# Patient Record
Sex: MALE | Race: WHITE | Employment: STUDENT | URBAN - METROPOLITAN AREA
[De-identification: names, ages, dates, MRNs, and addresses within clinical notes are randomized per-mention and may not be internally consistent; named-entity substitution may affect disease eponyms.]

---
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
## 2021-04-22 ENCOUNTER — IMMUNIZATION (OUTPATIENT)
Dept: NURSING | Facility: CLINIC | Age: 21
End: 2021-04-22
Payer: COMMERCIAL

## 2021-04-22 PROCEDURE — 91300 PR COVID VAC PFIZER DIL RECON 30 MCG/0.3 ML IM: CPT

## 2021-04-22 PROCEDURE — 0001A PR COVID VAC PFIZER DIL RECON 30 MCG/0.3 ML IM: CPT

## 2021-05-13 ENCOUNTER — IMMUNIZATION (OUTPATIENT)
Dept: NURSING | Facility: CLINIC | Age: 21
End: 2021-05-13
Attending: INTERNAL MEDICINE
Payer: COMMERCIAL

## 2021-05-13 PROCEDURE — 91300 PR COVID VAC PFIZER DIL RECON 30 MCG/0.3 ML IM: CPT

## 2021-05-13 PROCEDURE — 0002A PR COVID VAC PFIZER DIL RECON 30 MCG/0.3 ML IM: CPT

## 2021-06-05 ENCOUNTER — HEALTH MAINTENANCE LETTER (OUTPATIENT)
Age: 21
End: 2021-06-05

## 2021-09-25 ENCOUNTER — HEALTH MAINTENANCE LETTER (OUTPATIENT)
Age: 21
End: 2021-09-25

## 2021-11-18 ENCOUNTER — HOSPITAL ENCOUNTER (OUTPATIENT)
Facility: CLINIC | Age: 21
Setting detail: OBSERVATION
Discharge: HOME | End: 2021-11-19
Attending: EMERGENCY MEDICINE | Admitting: NURSE PRACTITIONER
Payer: COMMERCIAL

## 2021-11-18 ENCOUNTER — APPOINTMENT (OUTPATIENT)
Dept: GENERAL RADIOLOGY | Facility: CLINIC | Age: 21
End: 2021-11-18
Attending: EMERGENCY MEDICINE
Payer: COMMERCIAL

## 2021-11-18 DIAGNOSIS — R55 NEAR SYNCOPE: ICD-10-CM

## 2021-11-18 DIAGNOSIS — Z20.822 CONTACT WITH AND (SUSPECTED) EXPOSURE TO COVID-19: ICD-10-CM

## 2021-11-18 DIAGNOSIS — Z11.52 ENCOUNTER FOR SCREENING LABORATORY TESTING FOR SEVERE ACUTE RESPIRATORY SYNDROME CORONAVIRUS 2 (SARS-COV-2): ICD-10-CM

## 2021-11-18 DIAGNOSIS — R42 DIZZINESS: ICD-10-CM

## 2021-11-18 DIAGNOSIS — D72.829 LEUKOCYTOSIS, UNSPECIFIED TYPE: ICD-10-CM

## 2021-11-18 DIAGNOSIS — H53.8 BLURRED VISION: ICD-10-CM

## 2021-11-18 LAB
ALBUMIN SERPL-MCNC: 4.4 G/DL (ref 3.4–5)
ALP SERPL-CCNC: 72 U/L (ref 40–150)
ALT SERPL W P-5'-P-CCNC: 25 U/L (ref 0–70)
ANION GAP SERPL CALCULATED.3IONS-SCNC: 6 MMOL/L (ref 3–14)
AST SERPL W P-5'-P-CCNC: 16 U/L (ref 0–45)
BASOPHILS # BLD AUTO: 0.1 10E3/UL (ref 0–0.2)
BASOPHILS NFR BLD AUTO: 1 %
BILIRUB SERPL-MCNC: 0.5 MG/DL (ref 0.2–1.3)
BUN SERPL-MCNC: 15 MG/DL (ref 7–30)
CALCIUM SERPL-MCNC: 9.5 MG/DL (ref 8.5–10.1)
CHLORIDE BLD-SCNC: 105 MMOL/L (ref 94–109)
CO2 SERPL-SCNC: 27 MMOL/L (ref 20–32)
CREAT SERPL-MCNC: 1.03 MG/DL (ref 0.66–1.25)
EOSINOPHIL # BLD AUTO: 0.1 10E3/UL (ref 0–0.7)
EOSINOPHIL NFR BLD AUTO: 1 %
ERYTHROCYTE [DISTWIDTH] IN BLOOD BY AUTOMATED COUNT: 11.8 % (ref 10–15)
GFR SERPL CREATININE-BSD FRML MDRD: >90 ML/MIN/1.73M2
GLUCOSE BLD-MCNC: 92 MG/DL (ref 70–99)
GLUCOSE BLDC GLUCOMTR-MCNC: 108 MG/DL (ref 70–99)
HCT VFR BLD AUTO: 49.6 % (ref 40–53)
HGB BLD-MCNC: 17.2 G/DL (ref 13.3–17.7)
IMM GRANULOCYTES # BLD: 0.1 10E3/UL
IMM GRANULOCYTES NFR BLD: 0 %
LYMPHOCYTES # BLD AUTO: 1.7 10E3/UL (ref 0.8–5.3)
LYMPHOCYTES NFR BLD AUTO: 11 %
MCH RBC QN AUTO: 29.8 PG (ref 26.5–33)
MCHC RBC AUTO-ENTMCNC: 34.7 G/DL (ref 31.5–36.5)
MCV RBC AUTO: 86 FL (ref 78–100)
MONOCYTES # BLD AUTO: 1.1 10E3/UL (ref 0–1.3)
MONOCYTES NFR BLD AUTO: 7 %
NEUTROPHILS # BLD AUTO: 12.1 10E3/UL (ref 1.6–8.3)
NEUTROPHILS NFR BLD AUTO: 80 %
NRBC # BLD AUTO: 0 10E3/UL
NRBC BLD AUTO-RTO: 0 /100
PLATELET # BLD AUTO: 285 10E3/UL (ref 150–450)
POTASSIUM BLD-SCNC: 4 MMOL/L (ref 3.4–5.3)
PROT SERPL-MCNC: 8 G/DL (ref 6.8–8.8)
RBC # BLD AUTO: 5.78 10E6/UL (ref 4.4–5.9)
SODIUM SERPL-SCNC: 138 MMOL/L (ref 133–144)
TROPONIN I SERPL-MCNC: <0.015 UG/L (ref 0–0.04)
WBC # BLD AUTO: 15.2 10E3/UL (ref 4–11)

## 2021-11-18 PROCEDURE — 84484 ASSAY OF TROPONIN QUANT: CPT | Performed by: EMERGENCY MEDICINE

## 2021-11-18 PROCEDURE — 85025 COMPLETE CBC W/AUTO DIFF WBC: CPT | Performed by: EMERGENCY MEDICINE

## 2021-11-18 PROCEDURE — 99285 EMERGENCY DEPT VISIT HI MDM: CPT | Mod: 25 | Performed by: EMERGENCY MEDICINE

## 2021-11-18 PROCEDURE — 99285 EMERGENCY DEPT VISIT HI MDM: CPT | Mod: GC | Performed by: EMERGENCY MEDICINE

## 2021-11-18 PROCEDURE — 36415 COLL VENOUS BLD VENIPUNCTURE: CPT | Performed by: EMERGENCY MEDICINE

## 2021-11-18 PROCEDURE — 71046 X-RAY EXAM CHEST 2 VIEWS: CPT

## 2021-11-18 PROCEDURE — G0378 HOSPITAL OBSERVATION PER HR: HCPCS

## 2021-11-18 PROCEDURE — 86140 C-REACTIVE PROTEIN: CPT

## 2021-11-18 PROCEDURE — C9803 HOPD COVID-19 SPEC COLLECT: HCPCS | Performed by: EMERGENCY MEDICINE

## 2021-11-18 PROCEDURE — 93005 ELECTROCARDIOGRAM TRACING: CPT | Performed by: EMERGENCY MEDICINE

## 2021-11-18 PROCEDURE — 99220 PR INITIAL OBSERVATION CARE,LEVEL III: CPT | Performed by: NURSE PRACTITIONER

## 2021-11-18 PROCEDURE — 71046 X-RAY EXAM CHEST 2 VIEWS: CPT | Mod: 26 | Performed by: RADIOLOGY

## 2021-11-18 PROCEDURE — 96360 HYDRATION IV INFUSION INIT: CPT | Performed by: EMERGENCY MEDICINE

## 2021-11-18 PROCEDURE — 258N000003 HC RX IP 258 OP 636: Performed by: NURSE PRACTITIONER

## 2021-11-18 PROCEDURE — U0005 INFEC AGEN DETEC AMPLI PROBE: HCPCS | Performed by: EMERGENCY MEDICINE

## 2021-11-18 PROCEDURE — 80053 COMPREHEN METABOLIC PANEL: CPT | Performed by: EMERGENCY MEDICINE

## 2021-11-18 RX ORDER — SODIUM CHLORIDE 9 MG/ML
INJECTION, SOLUTION INTRAVENOUS CONTINUOUS
Status: ACTIVE | OUTPATIENT
Start: 2021-11-18 | End: 2021-11-19

## 2021-11-18 RX ADMIN — SODIUM CHLORIDE: 9 INJECTION, SOLUTION INTRAVENOUS at 23:43

## 2021-11-19 ENCOUNTER — APPOINTMENT (OUTPATIENT)
Dept: CARDIOLOGY | Facility: CLINIC | Age: 21
End: 2021-11-19
Attending: NURSE PRACTITIONER
Payer: COMMERCIAL

## 2021-11-19 VITALS
TEMPERATURE: 97.9 F | DIASTOLIC BLOOD PRESSURE: 71 MMHG | HEIGHT: 70 IN | OXYGEN SATURATION: 99 % | HEART RATE: 66 BPM | SYSTOLIC BLOOD PRESSURE: 126 MMHG | RESPIRATION RATE: 16 BRPM

## 2021-11-19 LAB
ALBUMIN SERPL-MCNC: 3.5 G/DL (ref 3.4–5)
ALBUMIN UR-MCNC: 20 MG/DL
ALP SERPL-CCNC: 63 U/L (ref 40–150)
ALT SERPL W P-5'-P-CCNC: 21 U/L (ref 0–70)
ANION GAP SERPL CALCULATED.3IONS-SCNC: 2 MMOL/L (ref 3–14)
APPEARANCE UR: CLEAR
AST SERPL W P-5'-P-CCNC: 14 U/L (ref 0–45)
ATRIAL RATE - MUSE: 64 BPM
BILIRUB SERPL-MCNC: 0.6 MG/DL (ref 0.2–1.3)
BILIRUB UR QL STRIP: NEGATIVE
BUN SERPL-MCNC: 14 MG/DL (ref 7–30)
CALCIUM SERPL-MCNC: 9 MG/DL (ref 8.5–10.1)
CHLORIDE BLD-SCNC: 110 MMOL/L (ref 94–109)
CO2 SERPL-SCNC: 26 MMOL/L (ref 20–32)
COLOR UR AUTO: ABNORMAL
CREAT SERPL-MCNC: 0.96 MG/DL (ref 0.66–1.25)
CRP SERPL-MCNC: <2.9 MG/L (ref 0–8)
DIASTOLIC BLOOD PRESSURE - MUSE: NORMAL MMHG
ERYTHROCYTE [DISTWIDTH] IN BLOOD BY AUTOMATED COUNT: 11.9 % (ref 10–15)
GFR SERPL CREATININE-BSD FRML MDRD: >90 ML/MIN/1.73M2
GLUCOSE BLD-MCNC: 88 MG/DL (ref 70–99)
GLUCOSE UR STRIP-MCNC: NEGATIVE MG/DL
HCT VFR BLD AUTO: 47 % (ref 40–53)
HGB BLD-MCNC: 15.8 G/DL (ref 13.3–17.7)
HGB UR QL STRIP: NEGATIVE
HOLD SPECIMEN: NORMAL
HYALINE CASTS: 21 /LPF
INTERPRETATION ECG - MUSE: NORMAL
KETONES UR STRIP-MCNC: 10 MG/DL
LEUKOCYTE ESTERASE UR QL STRIP: NEGATIVE
MAGNESIUM SERPL-MCNC: 2.3 MG/DL (ref 1.6–2.3)
MCH RBC QN AUTO: 29.7 PG (ref 26.5–33)
MCHC RBC AUTO-ENTMCNC: 33.6 G/DL (ref 31.5–36.5)
MCV RBC AUTO: 88 FL (ref 78–100)
MUCOUS THREADS #/AREA URNS LPF: PRESENT /LPF
NITRATE UR QL: NEGATIVE
P AXIS - MUSE: 33 DEGREES
PH UR STRIP: 6 [PH] (ref 5–7)
PHOSPHATE SERPL-MCNC: 3.5 MG/DL (ref 2.5–4.5)
PLATELET # BLD AUTO: 266 10E3/UL (ref 150–450)
POTASSIUM BLD-SCNC: 3.8 MMOL/L (ref 3.4–5.3)
PR INTERVAL - MUSE: 134 MS
PROT SERPL-MCNC: 6.8 G/DL (ref 6.8–8.8)
QRS DURATION - MUSE: 106 MS
QT - MUSE: 402 MS
QTC - MUSE: 414 MS
R AXIS - MUSE: 87 DEGREES
RBC # BLD AUTO: 5.32 10E6/UL (ref 4.4–5.9)
RBC URINE: <1 /HPF
SARS-COV-2 RNA RESP QL NAA+PROBE: NEGATIVE
SODIUM SERPL-SCNC: 138 MMOL/L (ref 133–144)
SP GR UR STRIP: 1.02 (ref 1–1.03)
SYSTOLIC BLOOD PRESSURE - MUSE: NORMAL MMHG
T AXIS - MUSE: 56 DEGREES
TROPONIN I SERPL-MCNC: <0.015 UG/L (ref 0–0.04)
TROPONIN I SERPL-MCNC: <0.015 UG/L (ref 0–0.04)
UROBILINOGEN UR STRIP-MCNC: NORMAL MG/DL
VENTRICULAR RATE- MUSE: 64 BPM
WBC # BLD AUTO: 8.5 10E3/UL (ref 4–11)
WBC URINE: 4 /HPF

## 2021-11-19 PROCEDURE — C8928 TTE W OR W/O FOL W/CON,STRES: HCPCS

## 2021-11-19 PROCEDURE — 99217 PR OBSERVATION CARE DISCHARGE: CPT | Performed by: EMERGENCY MEDICINE

## 2021-11-19 PROCEDURE — 93350 STRESS TTE ONLY: CPT | Mod: 26 | Performed by: INTERNAL MEDICINE

## 2021-11-19 PROCEDURE — 999N000208 ECHO STRESS ECHOCARDIOGRAM

## 2021-11-19 PROCEDURE — 84100 ASSAY OF PHOSPHORUS: CPT | Performed by: NURSE PRACTITIONER

## 2021-11-19 PROCEDURE — 85027 COMPLETE CBC AUTOMATED: CPT | Performed by: NURSE PRACTITIONER

## 2021-11-19 PROCEDURE — G0378 HOSPITAL OBSERVATION PER HR: HCPCS

## 2021-11-19 PROCEDURE — 81001 URINALYSIS AUTO W/SCOPE: CPT | Performed by: NURSE PRACTITIONER

## 2021-11-19 PROCEDURE — 36415 COLL VENOUS BLD VENIPUNCTURE: CPT | Performed by: NURSE PRACTITIONER

## 2021-11-19 PROCEDURE — 258N000003 HC RX IP 258 OP 636: Performed by: NURSE PRACTITIONER

## 2021-11-19 PROCEDURE — 96361 HYDRATE IV INFUSION ADD-ON: CPT

## 2021-11-19 PROCEDURE — 250N000013 HC RX MED GY IP 250 OP 250 PS 637: Performed by: NURSE PRACTITIONER

## 2021-11-19 PROCEDURE — 93321 DOPPLER ECHO F-UP/LMTD STD: CPT | Mod: 26 | Performed by: INTERNAL MEDICINE

## 2021-11-19 PROCEDURE — 82040 ASSAY OF SERUM ALBUMIN: CPT | Performed by: NURSE PRACTITIONER

## 2021-11-19 PROCEDURE — 84484 ASSAY OF TROPONIN QUANT: CPT | Performed by: NURSE PRACTITIONER

## 2021-11-19 PROCEDURE — 96361 HYDRATE IV INFUSION ADD-ON: CPT | Performed by: EMERGENCY MEDICINE

## 2021-11-19 PROCEDURE — 93325 DOPPLER ECHO COLOR FLOW MAPG: CPT | Mod: 26 | Performed by: INTERNAL MEDICINE

## 2021-11-19 PROCEDURE — 93018 CV STRESS TEST I&R ONLY: CPT | Performed by: INTERNAL MEDICINE

## 2021-11-19 PROCEDURE — 93016 CV STRESS TEST SUPVJ ONLY: CPT | Performed by: INTERNAL MEDICINE

## 2021-11-19 PROCEDURE — 83735 ASSAY OF MAGNESIUM: CPT | Performed by: NURSE PRACTITIONER

## 2021-11-19 RX ORDER — CETIRIZINE HYDROCHLORIDE 10 MG/1
10 TABLET ORAL DAILY
Status: DISCONTINUED | OUTPATIENT
Start: 2021-11-19 | End: 2021-11-19 | Stop reason: HOSPADM

## 2021-11-19 RX ORDER — ACETAMINOPHEN 650 MG/1
650 SUPPOSITORY RECTAL EVERY 4 HOURS PRN
Status: DISCONTINUED | OUTPATIENT
Start: 2021-11-19 | End: 2021-11-19 | Stop reason: HOSPADM

## 2021-11-19 RX ORDER — ONDANSETRON 2 MG/ML
4 INJECTION INTRAMUSCULAR; INTRAVENOUS EVERY 6 HOURS PRN
Status: DISCONTINUED | OUTPATIENT
Start: 2021-11-19 | End: 2021-11-19 | Stop reason: HOSPADM

## 2021-11-19 RX ORDER — ACETAMINOPHEN 325 MG/1
650 TABLET ORAL EVERY 4 HOURS PRN
Status: DISCONTINUED | OUTPATIENT
Start: 2021-11-19 | End: 2021-11-19 | Stop reason: HOSPADM

## 2021-11-19 RX ORDER — LIDOCAINE 40 MG/G
CREAM TOPICAL
Status: DISCONTINUED | OUTPATIENT
Start: 2021-11-19 | End: 2021-11-19 | Stop reason: HOSPADM

## 2021-11-19 RX ORDER — ONDANSETRON 4 MG/1
4 TABLET, ORALLY DISINTEGRATING ORAL EVERY 6 HOURS PRN
Status: DISCONTINUED | OUTPATIENT
Start: 2021-11-19 | End: 2021-11-19 | Stop reason: HOSPADM

## 2021-11-19 RX ORDER — IPRATROPIUM BROMIDE AND ALBUTEROL SULFATE 2.5; .5 MG/3ML; MG/3ML
3 SOLUTION RESPIRATORY (INHALATION) EVERY 4 HOURS PRN
Status: DISCONTINUED | OUTPATIENT
Start: 2021-11-19 | End: 2021-11-19 | Stop reason: HOSPADM

## 2021-11-19 RX ORDER — NITROGLYCERIN 0.4 MG/1
0.4 TABLET SUBLINGUAL EVERY 5 MIN PRN
Status: DISCONTINUED | OUTPATIENT
Start: 2021-11-19 | End: 2021-11-19 | Stop reason: HOSPADM

## 2021-11-19 RX ADMIN — CETIRIZINE HYDROCHLORIDE 10 MG: 10 TABLET, FILM COATED ORAL at 08:13

## 2021-11-19 RX ADMIN — SODIUM CHLORIDE: 9 INJECTION, SOLUTION INTRAVENOUS at 08:15

## 2021-11-19 NOTE — PLAN OF CARE
Discharge instructions given and explained to the patient. The patient verbalized understanding. The peripheral iv was removed. The patient discharged with his belongings.

## 2021-11-19 NOTE — ED PROVIDER NOTES
ED Provider Note  [facility]      History     Chief Complaint   Patient presents with     Dizziness     Loss of Vision     returned after 3-4 seconds     HPI  Morgan Rico is a 21 year old male with known PMH of brochial asthma presented with transient blurring of vision and dizziness.  He developed dizziness while doing squiting exercise for 10 minutes  and developed blurring of his vision for about 1 minute. He also lost his balance but did not fall. Has not lost his consciousness. He didn't have palpitation,shortness of breath or chest pain.   No similar episode in the past. Has undergone similar intensitity of ecxercis in the pastNo hisitory of seizure.  Consumed coffee more than usual today otherwise no fever, headache or weakness of extremities. No tinnitus or vertigo  Has history of asthma. But no cough or wheezes.      Past Medical History  Past Medical History:   Diagnosis Date     Intermittent asthma     resolved 2016     Past Surgical History:   Procedure Laterality Date     NO HISTORY OF SURGERY       albuterol (PROAIR HFA, PROVENTIL HFA, VENTOLIN HFA) 108 (90 BASE) MCG/ACT inhaler  ALBUTEROL SULFATE (2.5 MG/3ML) 0.083% IN NEBU  loratadine (CLARITIN) 10 MG tablet  Pseudoephedrine HCl (SUDAFED PO)      Allergies   Allergen Reactions     No Known Drug Allergies      Seasonal Allergies      Unsure specific allergens.  Spring and fall.     Family History  Family History   Problem Relation Age of Onset     Asthma Father      Lipids Father      Depression Mother      Depression Paternal Grandmother      Coronary Artery Disease No family hx of      Colon Cancer No family hx of      Mental Illness No family hx of      Genetic Disorder No family hx of      Social History   Social History     Tobacco Use     Smoking status: Never Smoker     Smokeless tobacco: Never Used     Tobacco comment: Mom smokes outside   Substance Use Topics     Alcohol use: Yes     Drug use: No      Past  "medical history, past surgical history, medications, allergies, family history, and social history were reviewed with the patient. No additional pertinent items.       Review of Systems  A complete review of systems was performed with pertinent positives and negatives noted in the HPI, and all other systems negative.    Physical Exam   BP: 124/87  Pulse: 83  Temp: 98.9  F (37.2  C)  Resp: 16  Height: 177.8 cm (5' 10\")  SpO2: 100 %  Physical Exam   Constitutional:   Appears well. Awake and alert. Answers all questions appropriately.   Eyes:   No scleral icterus. PERRLA. EOMI. No nystagmus  ENT and Mouth:   NC/AT. Oropharynx clear.  Respiratory:   Breathing comfortably on room air. CTAB. No accessory muscle use. No crackles, wheezes, rhonchi or rales.  Cardiovascular:   No JVD, RRR. No murmurs, rubs or gallops.  GI:   Soft, nontender, nondistended. No guarding or rebound tenderness. No organomegaly.  Skin:   No rash. No ecchymoses or petechiae.  Musculoskeletal:   Normal muscle bulk and tone. Extremities warm and well perfused. DP and radial pulses 2+ bilaterally . No edema  Neurologic:   AOx3. CN grossly II-XII intact. No focal deficits. Face symmetric. Moving all extremities equally and independently. Romberg's test negative    ED Course     ED Course as of 11/18/21 2349   Thu Nov 18, 2021   2250 EKG 12-lead, tracing only   2330 EKG 12-lead, tracing only   2334 Chest XR,  PA & LAT   2347 UA with Microscopic reflex to Culture     Procedures            EKG Interpretation:      Interpreted by Jani Carolina MD  Time reviewed: 11;31  Symptoms at time of EKG: none   Rhythm: normal sinus  Rate: normal  Axis: normal  Ectopy: none  Conduction: normal  ST Segments/ T Waves: No ST-T wave changes  Q Waves: none  Comparison to prior: No old EKG available    Clinical Impression: normal EKG       Results for orders placed or performed during the hospital encounter of 11/18/21   Glucose by meter     Status: Abnormal   Result Value " Ref Range    GLUCOSE BY METER POCT 108 (H) 70 - 99 mg/dL   CBC with platelets and differential     Status: Abnormal   Result Value Ref Range    WBC Count 15.2 (H) 4.0 - 11.0 10e3/uL    RBC Count 5.78 4.40 - 5.90 10e6/uL    Hemoglobin 17.2 13.3 - 17.7 g/dL    Hematocrit 49.6 40.0 - 53.0 %    MCV 86 78 - 100 fL    MCH 29.8 26.5 - 33.0 pg    MCHC 34.7 31.5 - 36.5 g/dL    RDW 11.8 10.0 - 15.0 %    Platelet Count 285 150 - 450 10e3/uL    % Neutrophils 80 %    % Lymphocytes 11 %    % Monocytes 7 %    % Eosinophils 1 %    % Basophils 1 %    % Immature Granulocytes 0 %    NRBCs per 100 WBC 0 <1 /100    Absolute Neutrophils 12.1 (H) 1.6 - 8.3 10e3/uL    Absolute Lymphocytes 1.7 0.8 - 5.3 10e3/uL    Absolute Monocytes 1.1 0.0 - 1.3 10e3/uL    Absolute Eosinophils 0.1 0.0 - 0.7 10e3/uL    Absolute Basophils 0.1 0.0 - 0.2 10e3/uL    Absolute Immature Granulocytes 0.1 (H) <=0.0 10e3/uL    Absolute NRBCs 0.0 10e3/uL   EKG 12-lead, tracing only     Status: None (Preliminary result)   Result Value Ref Range    Systolic Blood Pressure  mmHg    Diastolic Blood Pressure  mmHg    Ventricular Rate 64 BPM    Atrial Rate 64 BPM    GA Interval 134 ms    QRS Duration 106 ms     ms    QTc 414 ms    P Axis 33 degrees    R AXIS 87 degrees    T Axis 56 degrees    Interpretation ECG Sinus rhythm  Normal ECG      CBC with platelets differential     Status: Abnormal    Narrative    The following orders were created for panel order CBC with platelets differential.  Procedure                               Abnormality         Status                     ---------                               -----------         ------                     CBC with platelets and d...[[id number]]  Abnormal            Final result                 Please view results for these tests on the individual orders.     Medications - No data to display     Assessments & Plan (with Medical Decision Making)   Morgan Rico is a 21 year old male with [facility]  [facility] presented with exertional presyncope. He developed dizziness and transient blurring of vision.: On exam: vitals are normal, clear chest and no JVD, normal S1 and S2. No murmur or gallop.Pupil PERRLA. EO No nystagmus . Romberg's test is negative. No weakness of extermities.  -ECG: Normal  -BMP: Normal  -WBC: Leukocytosis (18), with left shift  -RBS :Normal  -COVID swab:  -Troponin <0.015, not elevated  Given that he was exercising while he developed syncope is concerning for postural hypotension or vasovagaal syncope vs cardiac conditions like arrythmia or hypertrophic cardiomyopathy. Seizure is another possibility but he does not have abnormal body movement, loss of consciousness or confusion. NO previus history.  Will follow his rhythm and do Echocardiography in the morning.  Leukocytosis with left shift is concerning for acute  Infectious process. He does not have fever, cough headache, diarrhea , urinary urgency frequency.  -U/A normal  -CXR: unremarkable  -Will follow temperature. CRP, trend CBC.      I have reviewed the nursing notes. I have reviewed the findings, diagnosis, plan and need for follow up with the patient.    New Prescriptions    No medications on file       Final diagnoses:   None     This data collected with the Resident working in the Emergency Department.  Patient was seen and evaluated by myself and I repeated the history and physical exam with the patient.  The plan of care was discussed with them.  The key portions of the note including the entire assessment and plan reflect my documentation.        --  Mervat Bronson MD  [facility] EMERGENCY DEPARTMENT  11/18/2021     Mervat Bronson MD  12/03/21 0056

## 2021-11-19 NOTE — PLAN OF CARE
"Observation Goals      - Diagnostic tests and consults completed and resulted: Not met, stress test in am   - No further episodes of syncope and any new arrhythmia addressed with controlled heart rates: met   - Vital signs normal or at patient baseline and orthostatic vitals are normal and patient not lightheaded with standing: met   - Tolerating oral intake to maintain hydration: met   - Safe disposition plan has been identified: prior disposition     /86 (BP Location: Left arm)   Pulse 55   Temp 97.9  F (36.6  C) (Oral)   Resp 16   Ht 1.778 m (5' 10\")   SpO2 97%       "

## 2021-11-19 NOTE — PLAN OF CARE
"Observation Goals:  - Diagnostic tests and consults completed and resulted: met - echo stress echocardiogram completed this AM  - No further episodes of syncope and any new arrhythmia addressed with controlled heart rates: met - pt continues to deny dizziness this PM  - Vital signs normal or at patient baseline and orthostatic vitals are normal and patient not lightheaded with standing: met   - Tolerating oral intake to maintain hydration: met - pt tolerated a regular diet  - Safe disposition plan has been identified: in progress    Nurse to notify provider when observation goals have been met and patient is ready for discharge.  Pt continues to deny chest pain and n/v this PM. Pt stated that he is \"still feeling good, still tired, hoping to hear back about the results soon.\"    Temp: 98  F (36.7  C) Temp src: Oral BP: 118/70 Pulse: 80   Resp: 16 SpO2: 98 % O2 Device: None (Room air)      Teresa Castrejon, Student Nurse  "

## 2021-11-19 NOTE — PLAN OF CARE
"Observation Goals:  - Diagnostic tests and consults completed and resulted: in progress - echo stress echocardiogram scheduled this AM  - No further episodes of syncope and any new arrhythmia addressed with controlled heart rates: met - pt denied dizziness this AM  - Vital signs normal or at patient baseline and orthostatic vitals are normal and patient not lightheaded with standing: met   - Tolerating oral intake to maintain hydration: in progress - pt currently on clear liquid diet (no caffeine)  - Safe disposition plan has been identified: in progress    Nurse to notify provider when observation goals have been met and patient is ready for discharge.  Pt denied chest pain and n/v this AM. Pt stated that he is \"feeling good, just tired.\"    Temp: 98.2  F (36.8  C) Temp src: Oral BP: 129/79 Pulse: 51   Resp: 16 SpO2: 100 % O2 Device: None (Room air)      Teresa Castrejon, Student Nurse  "

## 2021-11-19 NOTE — DISCHARGE SUMMARY
Discharge Summary    Morgan Rico MRN# [id number]   YOB: 2000 Age: 21 year old     Date of Admission:  11/18/2021  Date of Discharge:  11/19/2021  4:57 PM  Admitting Physician:  MARY ALICE Knight CNP  Discharge Physician:  LISA MORGAN  Discharging Service:  Emergency Department Observation Unit     Primary Provider: Gianna Shaw          Discharge Diagnosis:     Near syncope    * No resolved hospital problems. *               Discharge Disposition:   Discharged to home           Condition on Discharge:   Discharge condition: Stable   Code status on discharge: Full Code           Procedures:   Cardiology procedures perfromed:   Stress echo             Discharge Medications:     Current Discharge Medication List      CONTINUE these medications which have NOT CHANGED    Details   albuterol (PROAIR HFA, PROVENTIL HFA, VENTOLIN HFA) 108 (90 BASE) MCG/ACT inhaler Inhale 2 puffs into the lungs every 6 hours as needed for shortness of breath / dyspnea.  Qty: 1 Inhaler, Refills: 1    Comments: DO NOT FILL UNLESS MOM CALLS FOR.  Associated Diagnoses: Intermittent asthma      loratadine (CLARITIN) 10 MG tablet Take 1 tablet (10 mg) by mouth daily  Qty: 30 tablet, Refills: 1                   Consultations:   No consultations were requested during this admission             Brief History of Illness:   Morgan Rico is a 21 year old male with a past medical history of asthma who presented to the [facility] emergency department for the evaluation of transient dizziness and blurry vision while exercising on 11/18/2021.           Hospital Course:   #.  Near syncope  #.  Blurry vision and dizziness during exercise  #.  Leukocytosis  Patient had blurry vision and dizziness while performing squats.  Benign exam. Troponin x1 is negative.  EKG with sinus rhythm.  CBC with leukocytosis (WBC 15.2).  Patient is afebrile and hemodynamically stable.  Chest x-ray is negative for  "infection.  UA with culture is pending for collection.  CMP is unremarkable.  Differential diagnosis includes arrhythmia, hypertrophic cardiomyopathy, seizure, and possible hypotension related to vasovagal versus dehydration. Patient underwent exercise stress echocardiogram and this was non-revealing. Serial troponin negative. No ectopy on telemetry. No further vision disturbance while in the ed observation unit. Patient denies chest pain or SOB. Unclear etiology of his initial symptoms at this time, likely vasovagal related. Patient HD stable and discharged home.   -Follow up with PCP as needed                Final Day of Progress before Discharge:       Physical Exam:  Blood pressure 126/71, pulse 66, temperature 97.9  F (36.6  C), temperature source Oral, resp. rate 16, height 1.778 m (5' 10\"), SpO2 99 %.    EXAM:  Physical Exam   Constitutional: Pt is oriented to person, place, and time.Pt appears well-developed and well-nourished.   HENT:   Head: Normocephalic and atraumatic.   Eyes: Conjunctivae are normal. Pupils are equal, round, and reactive to light.   Neck: Normal range of motion. Neck supple.   Cardiovascular: Normal rate, regular rhythm, normal heart sounds and intact distal pulses.    Pulmonary/Chest: Effort normal and breath sounds normal. No respiratory distress. Pt has no wheezes. Pt has no rales  Abdominal: Soft. Bowel sounds are normal. Pt exhibits no distension and no mass. No tenderness. Pt has no rebound and no guarding.   Musculoskeletal: Normal range of motion. Pt exhibits no edema.   Neurological: Pt is alert and oriented to person, place, and time. Normal reflexes.   Skin: Skin is warm and dry. No rash noted.   Psychiatric: Pt has a normal mood and affect. Behavior is normal. Judgment and thought content normal.             Data:  All laboratory data reviewed             Significant Results:   None  Results for orders placed or performed during the hospital encounter of 11/18/21   Chest XR, "  PA & LAT     Status: None    Narrative    EXAM: XR CHEST 2 VW  LOCATION: [facility]  DATE/TIME: 11/18/2021 11:26 PM    INDICATION: near syncope  COMPARISON: None.      Impression    IMPRESSION: Negative chest.   Glucose by meter     Status: Abnormal   Result Value Ref Range    GLUCOSE BY METER POCT 108 (H) 70 - 99 mg/dL   Comprehensive metabolic panel     Status: Normal   Result Value Ref Range    Sodium 138 133 - 144 mmol/L    Potassium 4.0 3.4 - 5.3 mmol/L    Chloride 105 94 - 109 mmol/L    Carbon Dioxide (CO2) 27 20 - 32 mmol/L    Anion Gap 6 3 - 14 mmol/L    Urea Nitrogen 15 7 - 30 mg/dL    Creatinine 1.03 0.66 - 1.25 mg/dL    Calcium 9.5 8.5 - 10.1 mg/dL    Glucose 92 70 - 99 mg/dL    Alkaline Phosphatase 72 40 - 150 U/L    AST 16 0 - 45 U/L    ALT 25 0 - 70 U/L    Protein Total 8.0 6.8 - 8.8 g/dL    Albumin 4.4 3.4 - 5.0 g/dL    Bilirubin Total 0.5 0.2 - 1.3 mg/dL    GFR Estimate >90 >60 mL/min/1.73m2   UA with Microscopic reflex to Culture     Status: Abnormal    Specimen: Urine, Midstream   Result Value Ref Range    Color Urine Light Yellow Colorless, Straw, Light Yellow, Yellow    Appearance Urine Clear Clear    Glucose Urine Negative Negative mg/dL    Bilirubin Urine Negative Negative    Ketones Urine 10  (A) Negative mg/dL    Specific Gravity Urine 1.021 1.003 - 1.035    Blood Urine Negative Negative    pH Urine 6.0 5.0 - 7.0    Protein Albumin Urine 20  (A) Negative mg/dL    Urobilinogen Urine Normal Normal, 2.0 mg/dL    Nitrite Urine Negative Negative    Leukocyte Esterase Urine Negative Negative    Mucus Urine Present (A) None Seen /LPF    RBC Urine <1 <=2 /HPF    WBC Urine 4 <=5 /HPF    Hyaline Casts Urine 21 (H) <=2 /LPF    Narrative    Urine Culture not indicated   Troponin I     Status: Normal   Result Value Ref Range    Troponin I <0.015 0.000 - 0.045 ug/L   CBC with platelets and differential     Status: Abnormal   Result Value Ref Range    WBC Count  15.2 (H) 4.0 - 11.0 10e3/uL    RBC Count 5.78 4.40 - 5.90 10e6/uL    Hemoglobin 17.2 13.3 - 17.7 g/dL    Hematocrit 49.6 40.0 - 53.0 %    MCV 86 78 - 100 fL    MCH 29.8 26.5 - 33.0 pg    MCHC 34.7 31.5 - 36.5 g/dL    RDW 11.8 10.0 - 15.0 %    Platelet Count 285 150 - 450 10e3/uL    % Neutrophils 80 %    % Lymphocytes 11 %    % Monocytes 7 %    % Eosinophils 1 %    % Basophils 1 %    % Immature Granulocytes 0 %    NRBCs per 100 WBC 0 <1 /100    Absolute Neutrophils 12.1 (H) 1.6 - 8.3 10e3/uL    Absolute Lymphocytes 1.7 0.8 - 5.3 10e3/uL    Absolute Monocytes 1.1 0.0 - 1.3 10e3/uL    Absolute Eosinophils 0.1 0.0 - 0.7 10e3/uL    Absolute Basophils 0.1 0.0 - 0.2 10e3/uL    Absolute Immature Granulocytes 0.1 (H) <=0.0 10e3/uL    Absolute NRBCs 0.0 10e3/uL   Asymptomatic COVID-19 Virus (Coronavirus) by PCR Nasopharyngeal     Status: Normal    Specimen: Nasopharyngeal; Swab   Result Value Ref Range    SARS CoV2 PCR Negative Negative, Testing sent to reference lab. Results will be returned via unsolicited result    Narrative    Testing was performed using the Xpert Xpress SARS-CoV-2 Assay on the  Cepheid Gene-Xpert Instrument Systems. Additional information about  this Emergency Use Authorization (EUA) assay can be found via the Lab  Guide. This test should be ordered for the detection of SARS-CoV-2 in  individuals who meet SARS-CoV-2 clinical and/or epidemiological  criteria. Test performance is unknown in asymptomatic patients. This  test is for in vitro diagnostic use under the FDA EUA for  laboratories certified under CLIA to perform high complexity testing.  This test has not been FDA cleared or approved. A negative result  does not rule out the presence of PCR inhibitors in the specimen or  target RNA in concentration below the limit of detection for the  assay. The possibility of a false negative should be considered if  the patient's recent exposure or clinical presentation suggests  COVID-19. This test was  validated by the [facility]. This laboratory is certified under  the Clinical Laboratory Improvement Amendments of 1988 (CLIA-88) as  qualified to perform high complexity laboratory testing.     CRP inflammation     Status: Normal   Result Value Ref Range    CRP Inflammation <2.9 0.0 - 8.0 mg/L   Troponin I     Status: Normal   Result Value Ref Range    Troponin I <0.015 0.000 - 0.045 ug/L   Extra Purple Top Tube     Status: None   Result Value Ref Range    Hold Specimen JIC    Troponin I     Status: Normal   Result Value Ref Range    Troponin I <0.015 0.000 - 0.045 ug/L   CBC with platelets     Status: Normal   Result Value Ref Range    WBC Count 8.5 4.0 - 11.0 10e3/uL    RBC Count 5.32 4.40 - 5.90 10e6/uL    Hemoglobin 15.8 13.3 - 17.7 g/dL    Hematocrit 47.0 40.0 - 53.0 %    MCV 88 78 - 100 fL    MCH 29.7 26.5 - 33.0 pg    MCHC 33.6 31.5 - 36.5 g/dL    RDW 11.9 10.0 - 15.0 %    Platelet Count 266 150 - 450 10e3/uL   Comprehensive metabolic panel     Status: Abnormal   Result Value Ref Range    Sodium 138 133 - 144 mmol/L    Potassium 3.8 3.4 - 5.3 mmol/L    Chloride 110 (H) 94 - 109 mmol/L    Carbon Dioxide (CO2) 26 20 - 32 mmol/L    Anion Gap 2 (L) 3 - 14 mmol/L    Urea Nitrogen 14 7 - 30 mg/dL    Creatinine 0.96 0.66 - 1.25 mg/dL    Calcium 9.0 8.5 - 10.1 mg/dL    Glucose 88 70 - 99 mg/dL    Alkaline Phosphatase 63 40 - 150 U/L    AST 14 0 - 45 U/L    ALT 21 0 - 70 U/L    Protein Total 6.8 6.8 - 8.8 g/dL    Albumin 3.5 3.4 - 5.0 g/dL    Bilirubin Total 0.6 0.2 - 1.3 mg/dL    GFR Estimate >90 >60 mL/min/1.73m2   Magnesium     Status: Normal   Result Value Ref Range    Magnesium 2.3 1.6 - 2.3 mg/dL   Phosphorus     Status: Normal   Result Value Ref Range    Phosphorus 3.5 2.5 - 4.5 mg/dL   EKG 12-lead, tracing only     Status: None   Result Value Ref Range    Systolic Blood Pressure  mmHg    Diastolic Blood Pressure  mmHg    Ventricular Rate 64 BPM    Atrial Rate  64 BPM    OH Interval 134 ms    QRS Duration 106 ms     ms    QTc 414 ms    P Axis 33 degrees    R AXIS 87 degrees    T Axis 56 degrees    Interpretation ECG Sinus rhythm  Normal ECG      Echo Stress Echocardiogram     Status: None    Narrative    [id number]  [facility]  [id number]  [id number]^JESUS^MANDI^RISSA     [facility],[place]  Echocardiography Laboratory  [address] [place] [address]  Name: BO ASHFORD  MRN: [id number]  : 2000  Study Date: 2021 10:00 AM  Age: 21 yrs  Gender: Male  Patient Location: [facility]  Reason For Study: Syncope  Ordering Physician: MANDI LEE  Performed By: Loyda Oliver RDCS, PADMINI     BSA: 1.9 m2  Height: 70 in  Weight: 165 lb  HR: 75  BP: 116/67 mmHg  ______________________________________________________________________________  Procedure  Stress Echo Bike with two dimensional, color and spectral Doppler performed.  Contrast Definity.  ______________________________________________________________________________  Interpretation Summary     Exercise stress echocardiogram with no inducible ischemia.     Target heart rate achieved. Normal blood pressure response to exercise.  No angina symptoms with exercise.  No ECG evidence of ischemia.  Normal segmental and global LV function with EF of approximately 55-60% at  rest; with exercise left ventricular cavity size decreases and LVEF increases  to 65-70%.  No stress induced regional wall motion abnormalities.  Good functional capacity for age.     No significant valvular dysfunction noted on screening 2D and Doppler  examination.  ______________________________________________________________________________  Stress  Definity (NDC #58772-203-30) given intravenously.  Patient was given 5ml mixture of 1.5ml Definity and 8.5ml saline.  5 ml wasted.  Definity Expiration 22 .  Definity Lot # 6294 .  Peak MVO2 32 ml/kg/min .  Percent predicted MVO2 89 %.  RPP  93297.  Maximum workload 175 isaacs.  Target Heart Rate was achieved.  Exercise was stopped due to leg fatigue.  The patient did not exhibit any symptoms during exercise.     Stress Results                                       Maximum Predicted HR:   200 bpm             Target HR: 170 bpm        % Maximum Predicted HR: 89 %                             Stage  DurationHeart Rate  BP                                 (mm:ss)   (bpm)                         Baseline            75    116/67                           Peak   11:13     178    172/78                        Stress Duration:   11:13 mm:ss *                     Maximum Stress HR: 178 bpm *     Left Ventricle  Left ventricular systolic function is normal.     Aortic Valve  The aortic valve is normal in structure and function.     Mitral Valve  The mitral valve is normal in structure and function.     Tricuspid Valve  The tricuspid valve is normal in structure and function.     Right Ventricle  The right ventricular systolic function is normal.     Pericardium  There is no pericardial effusion.  ______________________________________________________________________________  MMode/2D Measurements & Calculations  asc Aorta Diam: 2.9 cm     ______________________________________________________________________________  Report approved by: Danielle Reno 11/19/2021 11:17 AM         CBC with platelets differential     Status: Abnormal    Narrative    The following orders were created for panel order CBC with platelets differential.  Procedure                               Abnormality         Status                     ---------                               -----------         ------                     CBC with platelets and d...[[id number]]  Abnormal            Final result                 Please view results for these tests on the individual orders.   Extra Tube     Status: None    Narrative    The following orders were created for panel order Extra  Tube.  Procedure                               Abnormality         Status                     ---------                               -----------         ------                     Extra Purple Top Tube[[id number]]                            Final result                 Please view results for these tests on the individual orders.      Recent Results (from the past 48 hour(s))   Chest XR,  PA & LAT    Narrative    EXAM: XR CHEST 2 VW  LOCATION: [facility]  DATE/TIME: 2021 11:26 PM    INDICATION: near syncope  COMPARISON: None.      Impression    IMPRESSION: Negative chest.   Echo Stress Echocardiogram    Narrative    [id number]  [id number]  [id number]  [id number]^JESUS^MANDI^RISSA     [facility],[place]  Echocardiography Laboratory  [address] [place] [address]  Name: BO ASHFORD  MRN: [id number]  : 2000  Study Date: 2021 10:00 AM  Age: 21 yrs  Gender: Male  Patient Location: [facility]  Reason For Study: Syncope  Ordering Physician: MANDI LEE  Performed By: Loyda Oliver RDCS, MICHELLET     BSA: 1.9 m2  Height: 70 in  Weight: 165 lb  HR: 75  BP: 116/67 mmHg  ______________________________________________________________________________  Procedure  Stress Echo Bike with two dimensional, color and spectral Doppler performed.  Contrast Definity.  ______________________________________________________________________________  Interpretation Summary     Exercise stress echocardiogram with no inducible ischemia.     Target heart rate achieved. Normal blood pressure response to exercise.  No angina symptoms with exercise.  No ECG evidence of ischemia.  Normal segmental and global LV function with EF of approximately 55-60% at  rest; with exercise left ventricular cavity size decreases and LVEF increases  to 65-70%.  No stress induced regional wall motion abnormalities.  Good functional capacity for age.     No  significant valvular dysfunction noted on screening 2D and Doppler  examination.  ______________________________________________________________________________  Stress  Definity (NDC #26206-372-35) given intravenously.  Patient was given 5ml mixture of 1.5ml Definity and 8.5ml saline.  5 ml wasted.  Definity Expiration 12/1/22 .  Definity Lot # 6294 .  Peak MVO2 32 ml/kg/min .  Percent predicted MVO2 89 %.  RPP 88169.  Maximum workload 175 isaacs.  Target Heart Rate was achieved.  Exercise was stopped due to leg fatigue.  The patient did not exhibit any symptoms during exercise.     Stress Results                                       Maximum Predicted HR:   200 bpm             Target HR: 170 bpm        % Maximum Predicted HR: 89 %                             Stage  DurationHeart Rate  BP                                 (mm:ss)   (bpm)                         Baseline            75    116/67                           Peak   11:13     178    172/78                        Stress Duration:   11:13 mm:ss *                     Maximum Stress HR: 178 bpm *     Left Ventricle  Left ventricular systolic function is normal.     Aortic Valve  The aortic valve is normal in structure and function.     Mitral Valve  The mitral valve is normal in structure and function.     Tricuspid Valve  The tricuspid valve is normal in structure and function.     Right Ventricle  The right ventricular systolic function is normal.     Pericardium  There is no pericardial effusion.  ______________________________________________________________________________  MMode/2D Measurements & Calculations  asc Aorta Diam: 2.9 cm     ______________________________________________________________________________  Report approved by: Danielle Reno 11/19/2021 11:17 AM                      Pending Results:   Unresulted Labs Ordered in the Past 30 Days of this Admission     No orders found for last 31 day(s).                  Discharge Instructions and  Follow-Up:     Discharge Procedure Orders   Reason for your hospital stay   Order Comments: Dizziness, vision changes, and shortness of breath     Activity   Order Comments: Your activity upon discharge: activity as tolerated     Order Specific Question Answer Comments   Is discharge order? Yes      When to contact your care team   Order Comments: Please go to your nearest emergency room If you were to have a change in the type of chest pain i.e more severe, lasting longer or radiating to your shoulder, arm, neck, jaw or back, shortness of breath or increased pain with breathing, coughing up blood, feel dizzy or lightheaded, or notice swelling in one leg.     Discharge Instructions   Order Comments: You were admitted to the observation unit for evaluation of your chest pain.  Your EKG was normal. Troponins (a lab test to check if there was damage to the heart tissue) were checked and these were negative. Chest x-ray was normal.  You underwent a stress test and this was normal. The chest pain you came into the emergency room for does not appear to be cardiac chest pain. I recommend continuing your home medications and it will be very important to follow up with your primary care doctor early next week or sooner if your symptoms return.     Full Code     Order Specific Question Answer Comments   Code status determined by: Discussion with patient/ legal decision maker      Diet   Order Comments: Follow this diet upon discharge: Regular     Order Specific Question Answer Comments   Is discharge order? Yes           Attestation:  Betzaida Toribio PA-C.         This patient was discussed with the Care Team in the OBS Unit.  The patient's chart was reviewed and the patient was also seen and evaluated by me.  The plan of care was discussed and reviewed with the Care Team.  The above documentation reflects the evaluation, medical decision making and plan under my supervision.    Luis Yang MD, FACEP  [facility] Staff  Emergency Physician

## 2021-11-19 NOTE — ED TRIAGE NOTES
Pt arrived by foot from home after an episode of dizziness and 3-4 second loss of vision while working out.  Pt reports that it took a bit to regroup, vision returned and back to normal. Pt reports some fatigue and nasal pressure.    A&Ox4, VSS,

## 2021-11-19 NOTE — H&P
[facility] ED Observation Admission Note    Chief Complaint   Patient presents with     Dizziness     Loss of Vision     returned after 3-4 seconds       Assessment/Plan:  Morgan Rico is a 21 year old male with a past medical history of asthma who presented to the [facility] emergency department for the evaluation of transient dizziness and blurry vision while exercising on 11/18/2021.     #.  Near syncope  #.  Blurry vision and dizziness during exercise  #.  Leukocytosis  Patient had blurry vision and dizziness while performing squats.  Benign exam. Troponin x1 is negative.  EKG with sinus rhythm.  CBC with leukocytosis (WBC 15.2).  Patient is afebrile and hemodynamically stable.  Chest x-ray is negative for infection.  UA with culture is pending for collection.  CMP is unremarkable.  Differential diagnosis includes arrhythmia, hypertrophic cardiomyopathy, seizure, and possible hypotension related to vasovagal versus dehydration.  Overall plan is to admit patient to ED observation unit for close monitoring, and stress echocardiogram in the morning.  --IV normal saline at 100ml/hr x1 liter  --Troponin x2  --Cardiac monitoring and serial vital signs  --Exercise a stress test in the morning  --If stress echo is normal, consider Zio patch  --Routine labs: CBC, CMP, Mg and Phos in the morning  --No caffeine till stress echo has been completed    Consults: none  FEN: IV hydration  DVT prophylaxis: Anticipating short admission and encourage patient to ambulate  Code Status: Full code  Disposition: Discharge patient to home    HPI:  Morgan Rico is a 21 year old male with a past medical history of asthma who presented to the [facility] emergency department for the evaluation of transient dizziness and blurry vision while exercising on 11/18/2021.     History obtained from the patient, and he denies having symptoms during exam..  He reports feeling dizzy with shortness  of breath, blurry vision and he could not see for about a minute x1 episode that occurred during a squat exercise.  He also had tingling on the tips of his fingers, and around his jaw.  He felt out of balance but he did not fall or loss conscious.  No family or personal history of heart problems, or seizures.  Patient denies fever, chills, malaise, headache, sinus pain, tinnitus, chest pain, heart palpitation, nausea, vomiting, abdominal pain, diarrhea, or constipation.  No history of drug or alcohol use.  However, he feels that his caffeine intake today was more than usual.    In the ED the patient was stable.     History:    Past Medical History:   Diagnosis Date     Intermittent asthma     resolved 2016       Past Surgical History:   Procedure Laterality Date     NO HISTORY OF SURGERY         Family History   Problem Relation Age of Onset     Asthma Father      Lipids Father      Depression Mother      Depression Paternal Grandmother      Coronary Artery Disease No family hx of      Colon Cancer No family hx of      Mental Illness No family hx of      Genetic Disorder No family hx of        Social History     Socioeconomic History     Marital status: Single     Spouse name: Not on file     Number of children: Not on file     Years of education: Not on file     Highest education level: Not on file   Occupational History     Not on file   Tobacco Use     Smoking status: Never Smoker     Smokeless tobacco: Never Used     Tobacco comment: Mom smokes outside   Substance and Sexual Activity     Alcohol use: Yes     Drug use: No     Sexual activity: Never   Other Topics Concern     Not on file   Social History Narrative     Not on file     Social Determinants of Health     Financial Resource Strain: Not on file   Food Insecurity: Not on file   Transportation Needs: Not on file   Physical Activity: Not on file   Stress: Not on file   Social Connections: Not on file   Intimate Partner Violence: Not on file   Housing  Stability: Not on file       No current facility-administered medications on file prior to encounter.  albuterol (PROAIR HFA, PROVENTIL HFA, VENTOLIN HFA) 108 (90 BASE) MCG/ACT inhaler, Inhale 2 puffs into the lungs every 6 hours as needed for shortness of breath / dyspnea.  ALBUTEROL SULFATE (2.5 MG/3ML) 0.083% IN NEBU, use one vial via nebulizer every 4 to 6 hours as needed  loratadine (CLARITIN) 10 MG tablet, Take 1 tablet (10 mg) by mouth daily  Pseudoephedrine HCl (SUDAFED PO),         Data:    Results for orders placed or performed during the hospital encounter of 11/18/21   Glucose by meter     Status: Abnormal   Result Value Ref Range    GLUCOSE BY METER POCT 108 (H) 70 - 99 mg/dL   Comprehensive metabolic panel     Status: Normal   Result Value Ref Range    Sodium 138 133 - 144 mmol/L    Potassium 4.0 3.4 - 5.3 mmol/L    Chloride 105 94 - 109 mmol/L    Carbon Dioxide (CO2) 27 20 - 32 mmol/L    Anion Gap 6 3 - 14 mmol/L    Urea Nitrogen 15 7 - 30 mg/dL    Creatinine 1.03 0.66 - 1.25 mg/dL    Calcium 9.5 8.5 - 10.1 mg/dL    Glucose 92 70 - 99 mg/dL    Alkaline Phosphatase 72 40 - 150 U/L    AST 16 0 - 45 U/L    ALT 25 0 - 70 U/L    Protein Total 8.0 6.8 - 8.8 g/dL    Albumin 4.4 3.4 - 5.0 g/dL    Bilirubin Total 0.5 0.2 - 1.3 mg/dL    GFR Estimate >90 >60 mL/min/1.73m2   Troponin I     Status: Normal   Result Value Ref Range    Troponin I <0.015 0.000 - 0.045 ug/L   CBC with platelets and differential     Status: Abnormal   Result Value Ref Range    WBC Count 15.2 (H) 4.0 - 11.0 10e3/uL    RBC Count 5.78 4.40 - 5.90 10e6/uL    Hemoglobin 17.2 13.3 - 17.7 g/dL    Hematocrit 49.6 40.0 - 53.0 %    MCV 86 78 - 100 fL    MCH 29.8 26.5 - 33.0 pg    MCHC 34.7 31.5 - 36.5 g/dL    RDW 11.8 10.0 - 15.0 %    Platelet Count 285 150 - 450 10e3/uL    % Neutrophils 80 %    % Lymphocytes 11 %    % Monocytes 7 %    % Eosinophils 1 %    % Basophils 1 %    % Immature Granulocytes 0 %    NRBCs per 100 WBC 0 <1 /100    Absolute  Neutrophils 12.1 (H) 1.6 - 8.3 10e3/uL    Absolute Lymphocytes 1.7 0.8 - 5.3 10e3/uL    Absolute Monocytes 1.1 0.0 - 1.3 10e3/uL    Absolute Eosinophils 0.1 0.0 - 0.7 10e3/uL    Absolute Basophils 0.1 0.0 - 0.2 10e3/uL    Absolute Immature Granulocytes 0.1 (H) <=0.0 10e3/uL    Absolute NRBCs 0.0 10e3/uL   EKG 12-lead, tracing only     Status: None (Preliminary result)   Result Value Ref Range    Systolic Blood Pressure  mmHg    Diastolic Blood Pressure  mmHg    Ventricular Rate 64 BPM    Atrial Rate 64 BPM    WI Interval 134 ms    QRS Duration 106 ms     ms    QTc 414 ms    P Axis 33 degrees    R AXIS 87 degrees    T Axis 56 degrees    Interpretation ECG Sinus rhythm  Normal ECG      CBC with platelets differential     Status: Abnormal    Narrative    The following orders were created for panel order CBC with platelets differential.  Procedure                               Abnormality         Status                     ---------                               -----------         ------                     CBC with platelets and d...[[id number]]  Abnormal            Final result                 Please view results for these tests on the individual orders.             EKG Interpretation:    Interpreted by Jani Carolina MD    Time reviewed: 11:31  Symptoms at time of EKG: none   Rhythm: normal sinus  Rate: normal  Axis: normal  Ectopy: none  Conduction: normal  ST Segments/ T Waves: No ST-T wave changes  Q Waves: none  Comparison to prior: No old EKG available     Clinical Impression: normal EKG    ROS:    Review Of Systems  Please review H&P for pertinent positives.      Exam:    Vitals:  B/P: 127/78, T: 98.9, P: 69, R: 16    Constitutional: Pt is oriented to person, place, and time.Pt appears well-developed and well-nourished.   HENT:   Head: Normocephalic and atraumatic.   Eyes: Conjunctivae are normal. Pupils are equal, round, and reactive to light.   Neck: Normal range of motion. Neck supple.   Cardiovascular:  Normal rate, regular rhythm, normal heart sounds and intact distal pulses.    Pulmonary/Chest: Effort normal and breath sounds normal. No respiratory distress. Pt has no wheezes. Pt has no rales  Abdominal: Soft. Bowel sounds are normal. Pt exhibits no distension and no mass. No tenderness. Pt has no rebound and no guarding.   Musculoskeletal: Normal range of motion. Pt exhibits no edema.   Neurological: Pt is alert and oriented to person, place, and time. Normal reflexes.   Skin: Skin is warm and dry. No rash noted.   Psychiatric: Pt has a normal mood and affect. Behavior is normal. Judgment and thought content normal.     Signed:  MARY ALICE Renee CNP (ED Observation [facility])  ED Observation Unit   Phone: [phone]    November 18, 2021 at 11:35 PM      Patient was seen and evaluated by ER Staff during the OBS Unit stay (see separate note).  The medical decision making and plan of care was discussed with the OBS Care Team and was supervised by ER Staff.  The documentation above accurately reflects the patient's initial evaluation, care and disposition under my supervision in the OBS Unit.    Luis Yang MD, FACEP  [facility] Staff Emergency Physician

## 2021-11-22 ENCOUNTER — PATIENT OUTREACH (OUTPATIENT)
Dept: PEDIATRICS | Facility: OTHER | Age: 21
End: 2021-11-22
Payer: COMMERCIAL

## 2021-11-22 NOTE — TELEPHONE ENCOUNTER
Reason for follow up call: Morgan Rico appeared on our list for being seen in and recenlty discharge from the Emergency Room/In Patient Hospital Admission.    Chief Complaint   Patient presents with     Hospital F/U     observation       TCM Episode no    Encounter routed for Clinic Triage RN to call for follow up

## 2022-07-02 ENCOUNTER — HEALTH MAINTENANCE LETTER (OUTPATIENT)
Age: 22
End: 2022-07-02

## 2023-01-07 ENCOUNTER — HEALTH MAINTENANCE LETTER (OUTPATIENT)
Age: 23
End: 2023-01-07

## 2023-07-15 ENCOUNTER — HEALTH MAINTENANCE LETTER (OUTPATIENT)
Age: 23
End: 2023-07-15

## 2024-09-07 ENCOUNTER — HEALTH MAINTENANCE LETTER (OUTPATIENT)
Age: 24
End: 2024-09-07